# Patient Record
Sex: FEMALE | Race: WHITE | Employment: UNEMPLOYED | URBAN - METROPOLITAN AREA
[De-identification: names, ages, dates, MRNs, and addresses within clinical notes are randomized per-mention and may not be internally consistent; named-entity substitution may affect disease eponyms.]

---

## 2023-03-04 ENCOUNTER — APPOINTMENT (OUTPATIENT)
Dept: GENERAL RADIOLOGY | Age: 48
End: 2023-03-04
Payer: COMMERCIAL

## 2023-03-04 ENCOUNTER — HOSPITAL ENCOUNTER (EMERGENCY)
Age: 48
Discharge: HOME | End: 2023-03-04
Attending: EMERGENCY MEDICINE
Payer: COMMERCIAL

## 2023-03-04 VITALS
SYSTOLIC BLOOD PRESSURE: 117 MMHG | OXYGEN SATURATION: 95 % | RESPIRATION RATE: 16 BRPM | TEMPERATURE: 98.3 F | HEART RATE: 72 BPM | DIASTOLIC BLOOD PRESSURE: 74 MMHG

## 2023-03-04 DIAGNOSIS — R42 DIZZINESS: Primary | ICD-10-CM

## 2023-03-04 DIAGNOSIS — E87.6 HYPOKALEMIA: ICD-10-CM

## 2023-03-04 LAB
ABSOLUTE EOS #: 0.24 K/UL (ref 0–0.44)
ABSOLUTE IMMATURE GRANULOCYTE: <0.03 K/UL (ref 0–0.3)
ABSOLUTE LYMPH #: 4.83 K/UL (ref 1.1–3.7)
ABSOLUTE MONO #: 0.74 K/UL (ref 0.1–1.2)
ALBUMIN SERPL-MCNC: 4.2 G/DL (ref 3.5–5.2)
ALBUMIN/GLOBULIN RATIO: 1.6 (ref 1–2.5)
ALP SERPL-CCNC: 51 U/L (ref 35–104)
ALT SERPL-CCNC: 17 U/L (ref 5–33)
ANION GAP SERPL CALCULATED.3IONS-SCNC: 14 MMOL/L (ref 9–17)
AST SERPL-CCNC: 17 U/L
BASOPHILS # BLD: 1 % (ref 0–2)
BASOPHILS ABSOLUTE: 0.07 K/UL (ref 0–0.2)
BILIRUB SERPL-MCNC: <0.1 MG/DL (ref 0.3–1.2)
BNP SERPL-MCNC: <36 PG/ML
BUN SERPL-MCNC: 13 MG/DL (ref 6–20)
CALCIUM SERPL-MCNC: 9.5 MG/DL (ref 8.6–10.4)
CHLORIDE SERPL-SCNC: 103 MMOL/L (ref 98–107)
CO2 SERPL-SCNC: 22 MMOL/L (ref 20–31)
CREAT SERPL-MCNC: 0.64 MG/DL (ref 0.5–0.9)
EOSINOPHILS RELATIVE PERCENT: 2 % (ref 1–4)
GFR SERPL CREATININE-BSD FRML MDRD: >60 ML/MIN/1.73M2
GLUCOSE SERPL-MCNC: 97 MG/DL (ref 70–99)
HCG QUALITATIVE: NEGATIVE
HCT VFR BLD AUTO: 39 % (ref 36.3–47.1)
HGB BLD-MCNC: 12.6 G/DL (ref 11.9–15.1)
IMMATURE GRANULOCYTES: 0 %
LYMPHOCYTES # BLD: 48 % (ref 24–43)
MAGNESIUM SERPL-MCNC: 2.2 MG/DL (ref 1.6–2.6)
MCH RBC QN AUTO: 30.9 PG (ref 25.2–33.5)
MCHC RBC AUTO-ENTMCNC: 32.3 G/DL (ref 28.4–34.8)
MCV RBC AUTO: 95.6 FL (ref 82.6–102.9)
MONOCYTES # BLD: 7 % (ref 3–12)
NRBC AUTOMATED: 0 PER 100 WBC
PDW BLD-RTO: 12.7 % (ref 11.8–14.4)
PLATELET # BLD AUTO: 288 K/UL (ref 138–453)
PMV BLD AUTO: 12.5 FL (ref 8.1–13.5)
POTASSIUM SERPL-SCNC: 2.9 MMOL/L (ref 3.7–5.3)
POTASSIUM SERPL-SCNC: 3.2 MMOL/L (ref 3.7–5.3)
PROT SERPL-MCNC: 6.8 G/DL (ref 6.4–8.3)
RBC # BLD: 4.08 M/UL (ref 3.95–5.11)
SEG NEUTROPHILS: 42 % (ref 36–65)
SEGMENTED NEUTROPHILS ABSOLUTE COUNT: 4.33 K/UL (ref 1.5–8.1)
SODIUM SERPL-SCNC: 139 MMOL/L (ref 135–144)
TROPONIN I SERPL DL<=0.01 NG/ML-MCNC: <6 NG/L (ref 0–14)
WBC # BLD AUTO: 10.2 K/UL (ref 3.5–11.3)

## 2023-03-04 PROCEDURE — 99285 EMERGENCY DEPT VISIT HI MDM: CPT

## 2023-03-04 PROCEDURE — 96365 THER/PROPH/DIAG IV INF INIT: CPT

## 2023-03-04 PROCEDURE — 83735 ASSAY OF MAGNESIUM: CPT

## 2023-03-04 PROCEDURE — 80053 COMPREHEN METABOLIC PANEL: CPT

## 2023-03-04 PROCEDURE — 85025 COMPLETE CBC W/AUTO DIFF WBC: CPT

## 2023-03-04 PROCEDURE — 83880 ASSAY OF NATRIURETIC PEPTIDE: CPT

## 2023-03-04 PROCEDURE — 6370000000 HC RX 637 (ALT 250 FOR IP): Performed by: STUDENT IN AN ORGANIZED HEALTH CARE EDUCATION/TRAINING PROGRAM

## 2023-03-04 PROCEDURE — 6360000002 HC RX W HCPCS: Performed by: STUDENT IN AN ORGANIZED HEALTH CARE EDUCATION/TRAINING PROGRAM

## 2023-03-04 PROCEDURE — 84132 ASSAY OF SERUM POTASSIUM: CPT

## 2023-03-04 PROCEDURE — 84703 CHORIONIC GONADOTROPIN ASSAY: CPT

## 2023-03-04 PROCEDURE — 84484 ASSAY OF TROPONIN QUANT: CPT

## 2023-03-04 PROCEDURE — 71045 X-RAY EXAM CHEST 1 VIEW: CPT

## 2023-03-04 PROCEDURE — 96375 TX/PRO/DX INJ NEW DRUG ADDON: CPT

## 2023-03-04 PROCEDURE — 93005 ELECTROCARDIOGRAM TRACING: CPT | Performed by: STUDENT IN AN ORGANIZED HEALTH CARE EDUCATION/TRAINING PROGRAM

## 2023-03-04 PROCEDURE — 96366 THER/PROPH/DIAG IV INF ADDON: CPT

## 2023-03-04 RX ORDER — LORAZEPAM 2 MG/ML
1 INJECTION INTRAMUSCULAR ONCE
Status: COMPLETED | OUTPATIENT
Start: 2023-03-04 | End: 2023-03-04

## 2023-03-04 RX ORDER — POTASSIUM CHLORIDE 20 MEQ/1
40 TABLET, EXTENDED RELEASE ORAL ONCE
Status: COMPLETED | OUTPATIENT
Start: 2023-03-04 | End: 2023-03-04

## 2023-03-04 RX ORDER — POTASSIUM CHLORIDE 7.45 MG/ML
10 INJECTION INTRAVENOUS
Status: DISCONTINUED | OUTPATIENT
Start: 2023-03-04 | End: 2023-03-04 | Stop reason: HOSPADM

## 2023-03-04 RX ADMIN — POTASSIUM CHLORIDE 10 MEQ: 10 INJECTION, SOLUTION INTRAVENOUS at 21:05

## 2023-03-04 RX ADMIN — LORAZEPAM 1 MG: 2 INJECTION INTRAMUSCULAR; INTRAVENOUS at 17:41

## 2023-03-04 RX ADMIN — POTASSIUM CHLORIDE 40 MEQ: 1500 TABLET, EXTENDED RELEASE ORAL at 18:49

## 2023-03-04 RX ADMIN — POTASSIUM CHLORIDE 10 MEQ: 10 INJECTION, SOLUTION INTRAVENOUS at 18:54

## 2023-03-04 RX ADMIN — POTASSIUM CHLORIDE 10 MEQ: 10 INJECTION, SOLUTION INTRAVENOUS at 20:01

## 2023-03-04 ASSESSMENT — PAIN - FUNCTIONAL ASSESSMENT: PAIN_FUNCTIONAL_ASSESSMENT: NONE - DENIES PAIN

## 2023-03-04 ASSESSMENT — ENCOUNTER SYMPTOMS
NAUSEA: 0
SHORTNESS OF BREATH: 0
PHOTOPHOBIA: 0
COUGH: 0
VOMITING: 0

## 2023-03-04 NOTE — ED NOTES
Pt provided blankets. Social work at bedside. Patient reports she was on valium 10mg TID for 10 years while living in [facility]. Patient reports she then went to pain clinic in [address] [place] [address] and decreased to 5mg TID. Now on 2mg TID. Pt reports she is \"not doing well on the lower dose. \"      Brian Saldana RN  03/04/23 0826

## 2023-03-04 NOTE — DISCHARGE INSTRUCTIONS
Please make sure to follow-up as needed. You may go back to [facility] continue your treatment for your depression, anxiety. Your potassium was low, it was replaced. Please see banana or avocado    XR CHEST PORTABLE   Final Result   No acute process.

## 2023-03-04 NOTE — ED NOTES
Writer met with patient at bedside. Patient presents with rapid speech, tangential and reports feeling depressed and anxious. Patient denies suicidal and homicidal ideations, endorses feeling distressed. She states that she is and has been homeless for a long time. She switches topics rapidly but states she has two grown kids, and her family is mostly based in [place]. Patient reports she has been staying in [place] for the last 4 months and left with intentions to return to [place]. Its unclear to this writer, but patient ended up in [address] [place] [address]. She reports she cannot return to [place] because of her ex and reports he was a narcissist and a stalker. Unknown how long go this relationship was, but patient does appear to experience distress when discussing it. She reports that she is not sure what to do at this point. She reports being a depression medications that was helpful in the past but she is not able to recall it. Writer provides patient with options to go to a shelter today with mental health resources to remain in [facility] or [facility] [employer] is willing to transport patient back to the train station. She reports having a valid train ticket. Patient states that she does not feel she can go to the shelter as she is \"in distress. \"  Patient is familiar with shelter living, she is clean and well groomed and has her personal items on her. She is future oriented as she discusses wanting to find employment to secure and maintain stable housing. She does present with some elkin symptoms, however, patient is new to the area and baseline has not been established. She appears to be able to manage her own basic needs as she reports maintaining a well balanced diet, drinking plenty of water, has appropriate attire for the weather and is neatly groomed in appearance. She has her personal items with her as stated and she is open to mental health treatment. Because of this, she does not appear to be in an acute mental health crisis.     Writer to meet with patient when discharged to educate about local resources and shelter options or to transport patient back to the train station.       Marquis Haley, [address]  03/04/23 0454

## 2023-03-04 NOTE — ED NOTES
Lab called d/t K not resulted, reports they ran it twice, 2.9, critical lab reported and notified to Dr. Grace Chester.       Alanna Durbin RN  03/04/23 3604

## 2023-03-04 NOTE — ED PROVIDER NOTES
[facility]  ED  Emergency Department Encounter  Emergency Medicine Resident     Pt Name: Sesar Cabrera  MRN: [id number]  [place] 1975  Date of evaluation: 3/4/23  PCP:  None None    CHIEF COMPLAINT       Chief Complaint   Patient presents with    Dizziness    Depression       HISTORY OFPRESENT ILLNESS  (Location/Symptom, Timing/Onset, Context/Setting, Quality, Duration, Modifying Factors,Severity.)      Sesar Cabrera is a 56 y. o.yo female who presents with depression, dizziness. Patient states that she was on a benzo use 10 mg 3 times daily however due to a lot of life stressors and moving, she is no longer on her dose and now she is feeling shaky, chills. Patient says she does not have any chest pain or shortness of breath. She states that she is depressed, no thoughts of wanting to harm herself or wanting to harm others. Patient states that she currently does not have a place to stay. PAST MEDICAL / SURGICAL / SOCIAL / FAMILY HISTORY      has a past medical history of Anxiety, Hypertension, and PTSD (post-traumatic stress disorder). has a past surgical history that includes Cholecystectomy.      Social History     Socioeconomic History    Marital status: Single     Spouse name: Not on file    Number of children: Not on file    Years of education: Not on file    Highest education level: Not on file   Occupational History    Not on file   Tobacco Use    Smoking status: Former     Types: Cigarettes    Smokeless tobacco: Not on file   Substance and Sexual Activity    Alcohol use: Not Currently    Drug use: Never    Sexual activity: Not on file   Other Topics Concern    Not on file   Social History Narrative    Not on file     Social Determinants of Health     Financial Resource Strain: Not on file   Food Insecurity: Not on file   Transportation Needs: Not on file   Physical Activity: Not on file   Stress: Not on file   Social Connections: Not on file   Intimate Partner Violence: Not on file   Housing Stability: Not on file       History reviewed. No pertinent family history. Allergies:  Doxycycline    Home Medications:  Prior to Admission medications    Not on File       REVIEW OFSYSTEMS    (2-9 systems for level 4, 10 or more for level 5)      Review of Systems   Constitutional:  Negative for fatigue and fever. HENT:  Negative for congestion. Eyes:  Negative for photophobia and visual disturbance. Respiratory:  Negative for cough and shortness of breath. Cardiovascular:  Negative for chest pain. Gastrointestinal:  Negative for nausea and vomiting. Neurological:  Positive for tremors. Psychiatric/Behavioral:  Negative for self-injury and suicidal ideas. PHYSICAL EXAM   (up to 7 for level 4, 8 or more forlevel 5)      INITIAL VITALS:   ED Triage Vitals [03/04/23 1628]   BP Temp Temp Source Heart Rate Resp SpO2 Height Weight   (!) 147/113 98.3 °F (36.8 °C) Oral 81 18 99 % -- --       Physical Exam  Constitutional:       Appearance: Normal appearance. HENT:      Head: Normocephalic and atraumatic. Nose: Nose normal.      Mouth/Throat:      Mouth: Mucous membranes are moist.   Eyes:      Pupils: Pupils are equal, round, and reactive to light. Cardiovascular:      Rate and Rhythm: Normal rate. Pulmonary:      Effort: Pulmonary effort is normal.   Abdominal:      General: There is no distension. Tenderness: There is no abdominal tenderness. Musculoskeletal:         General: No swelling or deformity. Cervical back: Normal range of motion. No rigidity. Skin:     Coloration: Skin is not jaundiced. Findings: No bruising. Neurological:      General: No focal deficit present. Mental Status: She is alert and oriented to person, place, and time.    Psychiatric:         Mood and Affect: Mood normal.         Behavior: Behavior normal.      Comments: Crying, tangential thoughts       DIFFERENTIAL  DIAGNOSIS     PLAN (Jessica Farooq / Teena Gardner / EKG):  Orders Placed This Encounter   Procedures    XR CHEST PORTABLE    CBC with Auto Differential    Comprehensive Metabolic Panel    Magnesium    Brain Natriuretic Peptide    Troponin    HCG Qualitative, Serum    Potassium    Cardiac Monitor - ED Only    Continuous Pulse Oximetry    EKG 12 Lead    Saline lock IV       MEDICATIONS ORDERED:  Orders Placed This Encounter   Medications    LORazepam (ATIVAN) injection 1 mg    potassium chloride 10 mEq/100 mL IVPB (Peripheral Line)    potassium chloride (KLOR-CON M) extended release tablet 40 mEq         Medical Decision Making  52-year-old female who was on chronically Valium 3 times daily 10 mg however of breath cessation due to life stressors and moving. Patient current CIWA score of 8. She is crying, tremulous, not tachycardic, initial vitals were initially hypertensive however did get better. At this moment given complaints of dizziness, will get tach work-up, check pregnancy test, and check her electrolytes. Patient will be given a milligram of Ativan, will plan for admission due to concern for benzos withdrawal    Amount and/or Complexity of Data Reviewed  Labs: ordered. Radiology: ordered. ECG/medicine tests: ordered. Risk  Prescription drug management.        DIAGNOSTIC RESULTS / EMERGENCYDEPARTMENT COURSE / MDM     LABS:  Labs Reviewed   CBC WITH AUTO DIFFERENTIAL - Abnormal; Notable for the following components:       Result Value    Lymphocytes 48 (*)     Absolute Lymph # 4.83 (*)     All other components within normal limits   COMPREHENSIVE METABOLIC PANEL - Abnormal; Notable for the following components:    Potassium 2.9 (*)     Total Bilirubin <0.1 (*)     All other components within normal limits   POTASSIUM - Abnormal; Notable for the following components:    Potassium 3.2 (*)     All other components within normal limits   MAGNESIUM   BRAIN NATRIURETIC PEPTIDE   TROPONIN   HCG, SERUM, QUALITATIVE         RADIOLOGY:  XR CHEST PORTABLE    Result Date: 3/4/2023  EXAMINATION: ONE XRAY VIEW OF THE CHEST 3/4/2023 5:13 pm COMPARISON: None. HISTORY: ORDERING SYSTEM PROVIDED HISTORY: Shortness of Breath TECHNOLOGIST PROVIDED HISTORY: Shortness of Breath FINDINGS: The lungs are without acute focal process. There is no effusion or pneumothorax. The cardiomediastinal silhouette is without acute process. The osseous structures are without acute process. No acute process. EKG  EKG Interpretation    Interpreted by me    Rhythm: normal sinus   Rate: normal  Axis: normal  Ectopy: none  Conduction: normal  ST Segments: no acute change  T Waves: no acute change  Q Waves: none    Clinical Impression: no acute changes and normal EKG    All EKG's are interpreted by the Emergency Department Physicianwho either signs or Co-signs this chart in the absence of a cardiologist.    EMERGENCY DEPARTMENT COURSE:  ED Course as of 03/04/23 2239   Sat Mar 04, 2023   1752 Pt with negative pregnancy test [AN]   1819 Patient labs were overloaded, it seems as though her potassium was lower resulted. Her potassium critically low at 2.9, patient is not being discharged however she is due to be admitted for IV versus oral potassium repletion. Recheck and possibly discharge. [AN]   2142 Repeat potassium is 3.2 [AN]      ED Course User Index  [AN] Imelda Hernandez MD          PROCEDURES:  None    CONSULTS:  None    CRITICAL CARE:      FINAL IMPRESSION      1. Dizziness    2. Hypokalemia          DISPOSITION / PLAN     DISPOSITION Decision To Discharge 03/04/2023 09:43:17 PM      PATIENT REFERRED TO:  [facility] ED  [address] [place] [address]  [phone]    If symptoms worsen    DISCHARGE MEDICATIONS:  There are no discharge medications for this patient.       Imelda Hernandez MD  Emergency Medicine Resident    (Please note that portions of this note were completed with a voice recognition program.Efforts were made to edit the dictations but occasionally words are mis-transcribed.)        Vero Polk MD  Resident  03/04/23 7590

## 2023-03-04 NOTE — ED NOTES
Pt to ed via ems for dizziness, \"depression and hopelessness. \" patient denies SI/HI. Patient was at bus station, reports she was in [place], missed her \"layover\" and wanted to go to [facility]. Patient reports she was in [address] [place] [address] and was given medication but they are decreasing her benzos. Patient is alert and oriented but has flight of ideas. EMS was in the room while giving report while patient began crying and yelling \" get the men out of here, get the men out of here. \" EMS was not by the patient or talking, ems left room as requested by patient. Patient redirected but continues to have flight of ideas.    Social work notified of patient and requesting eval.      Xiao Jackson RN  03/04/23 0484

## 2023-03-04 NOTE — ED PROVIDER NOTES
[facility]     Emergency Department     Faculty Attestation    I performed a history and physical examination of the patient and discussed management with the resident. I reviewed the resident´s note and agree with the documented findings and plan of care. Any areas of disagreement are noted on the chart. I was personally present for the key portions of any procedures. I have documented in the chart those procedures where I was not present during the key portions. I have reviewed the emergency nurses triage note. I agree with the chief complaint, past medical history, past surgical history, allergies, medications, social and family history as documented unless otherwise noted below. For Physician Assistant/ Nurse Practitioner cases/documentation I have personally evaluated this patient and have completed at least one if not all key elements of the E/M (history, physical exam, and MDM). Additional findings are as noted. Patient has been on Valium for 24 years and has been recently cut back on her dosage, patient denies suicidal ideations, patient has pressured speech, mild tremors of the hands, no focal neurodeficits. Good airway.        EKG Interpretation    Interpreted by emergency department physician    Rhythm: normal sinus   Rate: normal/96  Axis: normal 25  Ectopy: none  Conduction: normal  ST Segments: no acute change  T Waves: no acute change  Q Waves: none    Clinical Impression: Normal EKG    Wally Shaw, III     Wally Shaw MD  03/04/23 8984

## 2023-03-05 NOTE — ED NOTES
Pt. Discharge instrucitons reviewed. Pt to wait in lobby for cab ride to [place]. Pt acknowledged understanding. Per. Dr. García Ball, pt does not need 4th bag of potassium.        Lisa Alcala RN  03/04/23 5349

## 2023-03-05 NOTE — ED NOTES
SW spoke to pt in lobby. Pt had cab for [employer] ordered by previous Sw. SW called and spoke to [facility] per pt request. Per SN pt can come there due to previous SW arranging transport and stay. Pt will continue to wait in lobby for transportation.      ULYSSES Worley  03/05/23 7366

## 2023-03-05 NOTE — ED NOTES
Writer at bedside to start 3rd bag od K+. Potassium level drawn and sent to lab at this time.       Venkata Haddad RN  03/04/23 1811

## 2023-03-05 NOTE — ED NOTES
Pt. Resting in bed, eyes closed, NAD. Pt resp even and non labored.      Marcelino Lebron RN  03/04/23 1939       Marcelino Lebron RN  03/04/23 2015

## 2023-03-06 LAB
EKG ATRIAL RATE: 96 BPM
EKG P AXIS: 53 DEGREES
EKG P-R INTERVAL: 134 MS
EKG Q-T INTERVAL: 350 MS
EKG QRS DURATION: 70 MS
EKG QTC CALCULATION (BAZETT): 442 MS
EKG R AXIS: 25 DEGREES
EKG T AXIS: -15 DEGREES
EKG VENTRICULAR RATE: 96 BPM

## 2023-10-04 ENCOUNTER — TELEPHONE (OUTPATIENT)
Dept: NEUROLOGY | Age: 48
End: 2023-10-04

## 2023-10-04 NOTE — TELEPHONE ENCOUNTER
10 04 2023 I called the patient times 3 (09 18 2023 and 09 25 2023 and 10 04 2023 at [phone]) to schedule new patient appointment with one of our providers, the number was not in service all three times, no response. No address so I was unable to mail a letter.  KS